# Patient Record
Sex: MALE | Race: WHITE | NOT HISPANIC OR LATINO | Employment: FULL TIME | URBAN - METROPOLITAN AREA
[De-identification: names, ages, dates, MRNs, and addresses within clinical notes are randomized per-mention and may not be internally consistent; named-entity substitution may affect disease eponyms.]

---

## 2018-03-20 ENCOUNTER — HOSPITAL ENCOUNTER (OUTPATIENT)
Dept: RADIOLOGY | Facility: HOSPITAL | Age: 43
Discharge: HOME | End: 2018-03-20
Attending: UROLOGY

## 2018-03-20 ENCOUNTER — HOSPITAL ENCOUNTER (OUTPATIENT)
Dept: CT IMAGING | Facility: HOSPITAL | Age: 43
Discharge: HOME | End: 2018-03-20
Attending: UROLOGY

## 2018-03-20 DIAGNOSIS — N20.0 KIDNEY STONE: ICD-10-CM

## 2018-03-21 ASSESSMENT — MIFFLIN-ST. JEOR: SCORE: 1896.9

## 2018-03-22 ENCOUNTER — SURGERY - HEALTHEAST (OUTPATIENT)
Dept: SURGERY | Facility: HOSPITAL | Age: 43
End: 2018-03-22

## 2018-03-22 ENCOUNTER — ANESTHESIA - HEALTHEAST (OUTPATIENT)
Dept: SURGERY | Facility: HOSPITAL | Age: 43
End: 2018-03-22

## 2019-06-27 ENCOUNTER — COMMUNICATION - HEALTHEAST (OUTPATIENT)
Dept: UROLOGY | Facility: CLINIC | Age: 44
End: 2019-06-27

## 2019-06-28 ENCOUNTER — COMMUNICATION - HEALTHEAST (OUTPATIENT)
Dept: UROLOGY | Facility: CLINIC | Age: 44
End: 2019-06-28

## 2019-07-01 ENCOUNTER — OFFICE VISIT - HEALTHEAST (OUTPATIENT)
Dept: UROLOGY | Facility: CLINIC | Age: 44
End: 2019-07-01

## 2019-07-01 DIAGNOSIS — N20.1 CALCULUS OF URETER: ICD-10-CM

## 2019-07-01 LAB
ALBUMIN UR-MCNC: NEGATIVE MG/DL
APPEARANCE UR: CLEAR
BILIRUB UR QL STRIP: NEGATIVE
COLOR UR AUTO: YELLOW
GLUCOSE UR STRIP-MCNC: NEGATIVE MG/DL
HGB UR QL STRIP: ABNORMAL
KETONES UR STRIP-MCNC: NEGATIVE MG/DL
LEUKOCYTE ESTERASE UR QL STRIP: NEGATIVE
NITRATE UR QL: NEGATIVE
PH UR STRIP: 6.5 [PH] (ref 5–8)
SP GR UR STRIP: 1.02 (ref 1–1.03)
UROBILINOGEN UR STRIP-ACNC: ABNORMAL

## 2020-07-31 ENCOUNTER — OFFICE VISIT - HEALTHEAST (OUTPATIENT)
Dept: UROLOGY | Facility: CLINIC | Age: 45
End: 2020-07-31

## 2020-07-31 DIAGNOSIS — N20.0 CALCULUS OF KIDNEY: ICD-10-CM

## 2020-08-03 ENCOUNTER — OFFICE VISIT - HEALTHEAST (OUTPATIENT)
Dept: UROLOGY | Facility: CLINIC | Age: 45
End: 2020-08-03

## 2020-08-03 DIAGNOSIS — N20.0 CALCULUS OF KIDNEY: ICD-10-CM

## 2020-08-03 DIAGNOSIS — R11.0 NAUSEA: ICD-10-CM

## 2020-08-06 ENCOUNTER — RECORDS - HEALTHEAST (OUTPATIENT)
Dept: ADMINISTRATIVE | Facility: OTHER | Age: 45
End: 2020-08-06

## 2020-08-12 ENCOUNTER — OFFICE VISIT - HEALTHEAST (OUTPATIENT)
Dept: FAMILY MEDICINE | Facility: CLINIC | Age: 45
End: 2020-08-12

## 2020-08-12 DIAGNOSIS — R10.11 RUQ ABDOMINAL PAIN: ICD-10-CM

## 2020-08-12 ASSESSMENT — MIFFLIN-ST. JEOR: SCORE: 1806.85

## 2020-08-20 ENCOUNTER — HOSPITAL ENCOUNTER (OUTPATIENT)
Dept: ULTRASOUND IMAGING | Facility: HOSPITAL | Age: 45
Discharge: HOME | End: 2020-08-20
Attending: FAMILY MEDICINE

## 2020-08-20 ENCOUNTER — COMMUNICATION - HEALTHEAST (OUTPATIENT)
Dept: FAMILY MEDICINE | Facility: CLINIC | Age: 45
End: 2020-08-20

## 2020-08-20 DIAGNOSIS — R10.11 RUQ ABDOMINAL PAIN: ICD-10-CM

## 2020-08-28 ENCOUNTER — RECORDS - HEALTHEAST (OUTPATIENT)
Dept: ADMINISTRATIVE | Facility: OTHER | Age: 45
End: 2020-08-28

## 2020-09-24 ENCOUNTER — RECORDS - HEALTHEAST (OUTPATIENT)
Dept: ADMINISTRATIVE | Facility: OTHER | Age: 45
End: 2020-09-24

## 2020-10-16 ENCOUNTER — AMBULATORY - HEALTHEAST (OUTPATIENT)
Dept: CARE COORDINATION | Facility: CLINIC | Age: 45
End: 2020-10-16

## 2020-10-16 DIAGNOSIS — R81 GLYCOSURIA: ICD-10-CM

## 2020-10-20 ENCOUNTER — COMMUNICATION - HEALTHEAST (OUTPATIENT)
Dept: NURSING | Facility: CLINIC | Age: 45
End: 2020-10-20

## 2021-03-03 ENCOUNTER — OFFICE VISIT - HEALTHEAST (OUTPATIENT)
Dept: FAMILY MEDICINE | Facility: CLINIC | Age: 46
End: 2021-03-03

## 2021-03-03 ENCOUNTER — COMMUNICATION - HEALTHEAST (OUTPATIENT)
Dept: FAMILY MEDICINE | Facility: CLINIC | Age: 46
End: 2021-03-03

## 2021-03-03 DIAGNOSIS — J45.21 MILD INTERMITTENT ASTHMA WITH ACUTE EXACERBATION: ICD-10-CM

## 2021-03-03 RX ORDER — ALBUTEROL SULFATE 90 UG/1
AEROSOL, METERED RESPIRATORY (INHALATION)
Qty: 1 INHALER | Refills: 5 | Status: SHIPPED | OUTPATIENT
Start: 2021-03-03 | End: 2022-12-13

## 2021-03-03 RX ORDER — BUDESONIDE AND FORMOTEROL FUMARATE DIHYDRATE 80; 4.5 UG/1; UG/1
2 AEROSOL RESPIRATORY (INHALATION) 2 TIMES DAILY PRN
Qty: 1 INHALER | Refills: 11 | Status: SHIPPED | OUTPATIENT
Start: 2021-03-03 | End: 2023-05-04

## 2021-03-03 ASSESSMENT — MIFFLIN-ST. JEOR: SCORE: 1868.31

## 2021-04-05 ENCOUNTER — COMMUNICATION - HEALTHEAST (OUTPATIENT)
Dept: FAMILY MEDICINE | Facility: CLINIC | Age: 46
End: 2021-04-05

## 2021-05-25 ENCOUNTER — RECORDS - HEALTHEAST (OUTPATIENT)
Dept: ADMINISTRATIVE | Facility: CLINIC | Age: 46
End: 2021-05-25

## 2021-05-28 ASSESSMENT — ASTHMA QUESTIONNAIRES: ACT_TOTALSCORE: 11

## 2021-05-29 ENCOUNTER — RECORDS - HEALTHEAST (OUTPATIENT)
Dept: ADMINISTRATIVE | Facility: CLINIC | Age: 46
End: 2021-05-29

## 2021-05-30 NOTE — TELEPHONE ENCOUNTER
Message left for patient to call clinic to set up an appointment for kidney stone follow-up for today or tomorrow.  Lorraine Durant RN

## 2021-05-30 NOTE — PROGRESS NOTES
Assessment/Plan:        Diagnoses and all orders for this visit:    Calculus of ureter  -     Symptom Control While Passing a Stone Education  -     Patient Stated Goal: Pass my stone  -     CT Abdomen Pelvis Without Oral Without IV Contrast; Future; Expected date: 07/31/2019  -     Urinalysis Macroscopic    Other orders  -     UNABLE TO FIND; Med Name:Digestive Optimizer by [employer]      Stone Management Plan  [facility] 7/1/2019   Urinary Tract Infection No suspicion of infection   Renal Colic Well controlled symptoms   Renal Failure No suspicion of renal failure   Current CT date 6/27/2019   Right sided stones? Yes   R Number of ureteral stones No ureteral stones   R Number of kidney stones  1   R GSD of kidney stones 2 - 4   R Hydronephrosis None   R Stone Event No current event   R Current Plan Observe   Observe rationale Limited stone burden with good prognosis for spontaneous passage   Left sided stones? Yes   L Number of ureteral stones 1   L GSD of ureteral stones 3   L Location of ureteral stone Proximal   L Number of kidney stones  1   L GSD of kidney stones < 2   L Hydronephrosis Mild   L Stone Event New event   Diagnosis date 6/27/2019   Initial location of primary symptomatic stone Proximal   Initial GSD of primary symptomatic stone 3   L MET Status Initiation   L Current Plan MET   MET (No Data)             Subjective:      HPI  Mr. Daniel Malhotra is a 43 y.o.  male presenting to the [facility] [place] for a new problem.    He is a recurrent calcium oxalate and calcium phosphate (apatite) stone former who has required stone clearance procedures. He has participated in stone risk evaluation in the remote past with uncertain findings. He has no identified modifiable stone risk factors. He has identified non-modifiable stone risks including:  early age at first stone.     He presented to ED with sudden onset, severe, left flank pain. He has had recurrent stones from age  22. Stone episodes are accompanied by significant GI upset. He has had a remote risk evaluation without clear results. 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 He did have a right ureteroscopy about 1 year ago.    CT scan is personally reviewed and demonstrates 3 mm left proximal stone.    Significant labs from presentation are detailed below. It is noted that his serum calcium is high.    PLAN  Will proceed with medical expulsive therapy. Risks and benefits were detailed of medical expulsive therapy including probability of stone passage, recurrent renal colic, and requirement of emergency medical and/or surgical care and further imaging. Patient verbalized understanding. Patient agrees with plan as discussed. He will return in 2 weeks with low dose CT scan.    Over the counter symptom control medications of ibuprofen, Dramamine and Tylenol were recommended.     He is very interested in exploring stone risk reduction at the conclusion of this episode.     ROS   Review of Systems  A 12 point comprehensive review of systems is negative except for HPI    Past Medical History:   Diagnosis Date     Gout      Kidney stone        Past Surgical History:   Procedure Laterality Date     [place] CYSTO/URETERO W/LITHOTRIPSY &INDWELL STENT INSRT Right 3/22/2018    Procedure: CYSTOSCOPY, RIGHT URETEROSCOPY WITH LASER LITHOTRIPSY AND STENT;  Surgeon: Antonio Blake MD;  Location: [facility] [place];  Service: Urology     MO CYSTOURETHROSCOPY,URETER CATHETER Right 3/22/2018    Procedure: CYSTOSCOPY, WITH RETROGRADE PYELOGRAM;  Surgeon: Antonio Blake MD;  Location: [facility] [place];  Service: Urology       Current Outpatient Medications   Medication Sig Dispense Refill     ondansetron (ZOFRAN) 4 MG tablet Take 1 tablet (4 mg total) by mouth every 8 (eight) hours as needed for nausea. 12 tablet 0     oxyCODONE-acetaminophen (PERCOCET/ENDOCET) 5-325 mg per tablet Take 1 tablet by mouth every 6 (six) hours as  needed for pain. 10 tablet 0     tamsulosin (FLOMAX) 0.4 mg cap Take 1 capsule (0.4 mg total) by mouth daily. 30 capsule 0     UNABLE TO FIND Med Name:Digestive Optimizer by [employer]       No current facility-administered medications for this visit.        No Known Allergies    Social History     Socioeconomic History     Marital status: Single     Spouse name: Not on file     Number of children: Not on file     Years of education: Not on file     Highest education level: Not on file   Occupational History     Occupation: Pest Control   Social Needs     Financial resource strain: Not on file     Food insecurity:     Worry: Not on file     Inability: Not on file     Transportation needs:     Medical: Not on file     Non-medical: Not on file   Tobacco Use     Smoking status: Current Some Day Smoker     Smokeless tobacco: Never Used   Substance and Sexual Activity     Alcohol use: Yes     Comment: occas     Drug use: Not on file     Sexual activity: Not on file   Lifestyle     Physical activity:     Days per week: Not on file     Minutes per session: Not on file     Stress: Not on file   Relationships     Social connections:     Talks on phone: Not on file     Gets together: Not on file     Attends Latter day service: Not on file     Active member of club or organization: Not on file     Attends meetings of clubs or organizations: Not on file     Relationship status: Not on file     Intimate partner violence:     Fear of current or ex partner: Not on file     Emotionally abused: Not on file     Physically abused: Not on file     Forced sexual activity: Not on file   Other Topics Concern     Not on file   Social History Narrative     Not on file       Family History   Problem Relation Age of Onset     Urolithiasis Neg Hx      Clotting disorder Neg Hx      Gout Neg Hx        Objective:      Physical Exam  Vitals:    07/01/19 1505   BP: (!) 140/95   Pulse: 84   Temp: 97.8  F (36.6  C)     General - well developed, well  nourished, appropriate for age. Appears no distress at this time   Heart - regular rate and rhythm, no murmur  Respiratory - normal effort, clear to auscultation, good air entry without adventitious noises  Abdomen - slender soft, non-tender, no hepatosplenomegaly, no masses.   - no flank tenderness, no suprapubic tenderness, kidney and bladder non-palpable  MSK - normal spinal curvature. no spinal tenderness. normal gait. muscular strength intact.  Neurology - cranial nerves II-XII grossly intact, normal sensation, no unsteadiness  Skin - intact, no bruising, no gouty tophi  Psych - oriented to time, place, and person, normal mood and affect.      Labs  Urinalysis POC (Office):  Nitrite, UA   Date Value Ref Range Status   07/01/2019 Negative Negative Final   06/27/2019 Negative Negative Final   04/04/2018 Negative Negative Final       Lab Urinalysis:  Blood, UA   Date Value Ref Range Status   07/01/2019 Trace (!) Negative Final   06/27/2019 Negative Negative Final   04/04/2018 Moderate (!) Negative Final     Nitrite, UA   Date Value Ref Range Status   07/01/2019 Negative Negative Final   06/27/2019 Negative Negative Final   04/04/2018 Negative Negative Final     Leukocytes, UA   Date Value Ref Range Status   07/01/2019 Negative Negative Final   06/27/2019 Negative Negative Final   04/04/2018 Small (!) Negative Final     pH, UA   Date Value Ref Range Status   07/01/2019 6.5 5.0 - 8.0 Final   06/27/2019 8.5 (H) 4.5 - 8.0 Final   04/04/2018 7.5 4.5 - 8.0 Final    and Acute Labs   CBC   WBC   Date Value Ref Range Status   06/27/2019 18.1 (H) 4.0 - 11.0 thou/uL Final   04/04/2018 21.0 (H) 4.0 - 11.0 thou/uL Final   03/23/2018 12.6 (H) 4.0 - 11.0 thou/uL Final     Hemoglobin   Date Value Ref Range Status   06/27/2019 17.5 14.0 - 18.0 g/dL Final   04/04/2018 16.9 14.0 - 18.0 g/dL Final   03/23/2018 16.9 14.0 - 18.0 g/dL Final     Platelets   Date Value Ref Range Status   06/27/2019 175 140 - 440 thou/uL Final    04/04/2018 204 140 - 440 thou/uL Final   03/23/2018 144 140 - 440 thou/uL Final   , C Reactive Protein    CRP   Date Value Ref Range Status   06/27/2019 0.4 0.0 - 0.8 mg/dL Final   , Renal Panel  [facility]  Creatinine   Date Value Ref Range Status   06/27/2019 1.10 0.70 - 1.30 mg/dL Final   04/04/2018 1.03 0.70 - 1.30 mg/dL Final   03/23/2018 0.86 0.70 - 1.30 mg/dL Final     Potassium   Date Value Ref Range Status   06/27/2019 4.3 3.5 - 5.0 mmol/L Final   04/04/2018 4.7 3.5 - 5.0 mmol/L Final   03/23/2018 4.5 3.5 - 5.0 mmol/L Final     Calcium   Date Value Ref Range Status   06/27/2019 10.9 (H) 8.5 - 10.5 mg/dL Final   04/04/2018 10.1 8.5 - 10.5 mg/dL Final   03/23/2018 9.8 8.5 - 10.5 mg/dL Final    and Urine Culture    Culture   Date Value Ref Range Status   04/04/2018 No Growth  Final   03/23/2018 No Growth  Final

## 2021-05-30 NOTE — PATIENT INSTRUCTIONS - HE
Patient Stated Goal: Pass my stone  Symptom Control While Passing A Stone    The goal of [facility] [place] is to let a smaller kidney stone (less than 4 to 5 mm) pass without intervention if possible. Giving your body a chance to clear the stone may take a few hours up to a few weeks.  Keeping you well-informed, safe and fairly comfortable is important.    Drink to thirst  Do not attempt to  flush out  your stone by drinking too much fluid. This does not work and may increase nausea. Drink enough to satisfy your body s thirst. Eating your normal diet is fine.   Medications (that may be suggested or prescribed)    Ibuprofen (Advil or Motrin) Available over the counter  o Take two (200mg) tablets every six hours until the stone passes.  o Prevents spasm of the ureter.    o Decreases pain.      Dramamine* (drowsy version, non-generic formulation) Available over the counter  o Take 50mg at bedtime  o Decreases spasms of the ureter  o Decreases nausea  o Decreases acute pain  o Decreases recurrence of pain for 24 hours  o Will help you sleep  *This medication will cause increase drowsiness, do not drive or operate machinery for 6 hours.      Narcotics (Percocet, Vicodin, Dilaudid) Take as prescribed for severe pain unrelieved by ibuprofen and Dramamine  o Narcotics have significant side effects and only  cover-up  pain. They have no effect on the cause of pain.  o Common side effects  - Confusion, disorientation and sedation - DO NOT DRIVE OR OPERATE MACHINERY WITHIN 24 HOURS  - Nausea - take Dramamine or Zofran or Haldol to help control  - Constipation  - Sleep disturbances      Ondansetron (Zofran) Take as prescribed  o Reserve for severe nausea  o May cause constipation, start over the counter Miralax if needed      Second Line Anti-Nausea Medication: Adding a different anti-nausea medication maybe helpful for persistent nausea.  The combined effect of different types of anti-nausea medications maybe more  effective than either medication by itself, even in higher doses.  o Compazine: Take as prescribed      Information about kidney stones    Crystals can form if chemicals are too concentrated in your urine. If the crystal grows over time, a stone may form. A stone usually isn t painful while it is still in the kidney.    As the stone begins to leave the kidney, you may experience episodes of flank pain as the kidney stone approaches the entrance to the ureter. Some people feel a vague ache in the side.    Kidney stones may fall into the ureter. Some stones are tiny and pass through without causing symptoms. The ureter is a small tube (approximately 1/8 of an inch wide). A kidney stone can get stuck and block the ureter. If this happens, urine backs up and flows back to the kidney. Back pressure on the kidney can cause:  o Severe flank pain radiating to the groin.  o Severe nausea and vomiting.  o The pain can occur in the lower back, side, groin or all three.      When the stone reaches the lower ureter, this can irritate the bladder and sensations of feeling the urge to urinate frequently and urgently may occur.      Once the stone passes out of your ureter and into your bladder, the symptoms of urgency and frequency will often disappear. Sometimes pain will come back for a short period and will not be as severe as before. The passage of the stone from your bladder and out of your body is usually not a problem. The urethra is at least twice as wide as the normal ureter, so the stone doesn t usually block it.    Strain all urine  If you pass the stone, save it. Place it in the container we have provided and bring it to the [facility] [place] within a week of passing it. Your stone will then be sent for analysis which takes about a month.     Signs and symptoms you might experience    Nausea    Decreased appetite    Urinary frequency    Bloody urine     Chills    Fatigue    When to call [facility] [place] or  go to the Emergency Room    Fever with a temperature greater than 100.1    Severe pain    Persistent nausea/vomiting    If the pain worsens or nausea/vomiting is uncontrolled with medications, STOP eating & drinking. You need to have an empty stomach for 8 hours prior to surgery. Call the [facility] [place] immediately at [phone].           Follow-up    Low dose CT scan with doctor visit 1-2 weeks after initial clinic visit per doctor s instructions    Please cancel the CT scan visit if you pass a stone. Reschedule for a one month follow-up with doctor to discuss stone composition and future prevention.    Preventing future stones    Approximately a month after your stone is sent out for analysis, a prevention visit will occur with your provider, to discuss an individualized plan for prevention of new stones and to discuss managing stones that you may still have. Along with the analysis of the kidney stone, blood and urine tests may be indicated to develop this plan. Knowing the type of kidney stones you make, and why, allows the providers at the [facility] to recommend specific ways to prevent them.    Follow-up visits are an important part of monitoring and preventing future re-occurrences.    The [facility] is available for questions or concerns 24 hours a day at [phone]

## 2021-06-01 VITALS — HEIGHT: 73 IN | WEIGHT: 209 LBS | BODY MASS INDEX: 27.7 KG/M2

## 2021-06-04 VITALS — BODY MASS INDEX: 25.33 KG/M2 | WEIGHT: 192 LBS

## 2021-06-10 NOTE — PROGRESS NOTES
Please call Daniel with ultrasound results. Attempted to relay results myself but no answer. Let him know it was normal. Is he still having pain or are symptoms resolved?  Laina Banda,  08/20/20 12:17 PM

## 2021-06-10 NOTE — PROGRESS NOTES
[facility] Note    Name: Daniel Malhotra  : 1975   MRN: [id number]    Daniel Malhotra is a 44 y.o. male presenting to discuss the following:     CC:   Chief Complaint   Patient presents with     Establish Care     Follow-up       HPI:  Notes correlation of symptoms with kidney stones, usually symptoms improve after he passes as stone.     Notes when he eats greasy or bad foods, feels sick. If he drinks beer or eats coffee, also feels sick. Gets a really sensitive stomach all the sudden. Also notes a correlation with sea food or red meat. Also correlates with eating too much bread. Doesn't know what to eat when symptoms flare, loses weight.     At times, feels fine, then feels onset of diaphoresis and looks pale to others. After about 8 hours of vomiting, laying around, cold sweats, feels better. Hot baths and showers also are helpful. Can't get symptoms to go away very easily. Tries ibuprofen and tylenol as well as a gas pill.    Stomach feels crampy and tripp in a rhythmic pattern. Sometimes goes to the ED, meds help.    Had taken Prilosec in the past and hasn't been helpful. Has not had an EGD in the past.     ROS:   CONSTITUTIONAL: No more fevers or chills. Appetite is still poor.   HEART: No chest pain or palpitations.   ABDOMEN: See above.     PMH:   Patient Active Problem List   Diagnosis     Elevated liver enzymes     Calculus of kidney       Past Medical History:   Diagnosis Date     Gout      Kidney stone        PSH:   Past Surgical History:   Procedure Laterality Date     [place] CYSTO/URETERO W/LITHOTRIPSY &INDWELL STENT INSRT Right 3/22/2018    Procedure: CYSTOSCOPY, RIGHT URETEROSCOPY WITH LASER LITHOTRIPSY AND STENT;  Surgeon: Antonio Blake MD;  Location: [facility];  Service: Urology     AK CYSTOURETHROSCOPY,URETER CATHETER Right 3/22/2018    Procedure: CYSTOSCOPY, WITH RETROGRADE PYELOGRAM;  Surgeon: Antonio Blake MD;  Location: [facility];  Service:  "Urology         MEDICATIONS:   Current Outpatient Medications on File Prior to Visit   Medication Sig Dispense Refill     dimenhyDRINATE (DRAMAMINE) 50 MG tablet Take 50 mg by mouth at bedtime as needed.       ibuprofen (IBUPROFEN IB) 200 MG tablet Take 200 mg by mouth every 6 (six) hours as needed for pain.       UNABLE TO FIND Med Name:Digestive Optimizer by [employer]       No current facility-administered medications on file prior to visit.        ALLERGIES:  No Known Allergies    FAMHx:  Family History   Problem Relation Age of Onset     Urolithiasis Neg Hx      Clotting disorder Neg Hx      Gout Neg Hx        SOCIAL HISTORY:   Social History     Tobacco Use     Smoking status: Current Some Day Smoker     Smokeless tobacco: Never Used   Substance Use Topics     Alcohol use: Yes     Comment: occas     Drug use: Not on file       PHYSICAL EXAM:   /88   Pulse 80   Temp 99.1  F (37.3  C) (Oral)   Ht 6' 0.5\" (1.842 m)   Wt 192 lb (87.1 kg)   BMI 25.68 kg/m     GENERAL: Daniel is a pleasant, well appearing male, in no acute distress.   HEENT: Sclera white, no cervical lymphadenopathy, no thyromegaly.   HEART: Regular rate and rhythm, no murmurs.   LUNGS: Clear to auscultation bilaterally, unlabored.   ABDOMEN: Soft, RUQ mildly tender to palpation, no guarding, no rigidity, no rebound tenderness, no palpable masses. No costovertebral angle tenderness.   EXTREMITIES: No lower extremity edema.     ASSESSMENT & PLAN:   Daniel Malhotra is a 44 y.o. male presenting today for ED follow up.     1. RUQ abdominal pain  - US Abdomen Limited; Future     Reviewed ED note from [facility] dated July 28.  He was diagnosed with acute right flank pain and elevated blood pressures without hypertension diagnosis.  Abdominal CT revealed multiple small bilateral nonobstructing kidney stones without hydronephrosis or obstructing ureteral stone.  Urine negative for infection or red blood cells.  White blood cells mildly elevated at " 12.2.  CMP normal.  Lipase normal.  Pain had improved and he was discharged for outpatient follow-up.    Discussed with patient that it is unlikely to be kidney stone mediated if he is not acutely having any obstruction or blockage.  Given location of pain and worsening with greasy foods, I am suspicious for possible underlying biliary colic.  Recommend we proceed with right upper quadrant ultrasound for further evaluation.  Consider evaluation with general surgery.  Also discussed differential diagnosis of pancreatitis which is unlikely because he had a normal lipase.  Differential diagnosis also includes acute gastritis, but he states that PPIs have not been helpful for this pain in the past.    2. Elevated blood pressure  Blood pressure is in the prehypertensive range today.  We will follow-up at next visit.    RTC: 1 month - follow up blood pressure / sooner as needed  Will follow up with RUQ US results.     Laina Banda, DO

## 2021-06-10 NOTE — PROGRESS NOTES
"Assessment/Plan:        1) kidney stones - complete risk evaluation  2) abdominal pain - sounds like GI issue. Start with PCP and hopefully avoid further ED encounters        Phone call duration: 24 minutes    Simone Traore MD     Subjective:      The patient has been notified of following:     \"This telephone visit will be conducted via a call between you and your physician/provider. We have found that certain health care needs can be provided without the need for a physical exam.  This service lets us provide the care you need with a short phone conversation.  If a prescription is necessary we can send it directly to your pharmacy.  If labs and/or imaging are needed, we can place orders so you can have the test (s) done at a later time.    If during the course of the call the physician/provider feels a telephone visit is not appropriate, you will not be charged for this service.\"     HPI  Mr. Daniel Malhotra is a 44 y.o.  male who is being evaluated via a billable telephone visit by [facility] [place] for follow up of his stone disease.     He has had days to weeks of nausea, vomiting, anterior abdominal pain, abdominal bloating, and belching. Says he is unable to sleep. Getting fluids in but not much else. Very frustrated with persistence. Similar to symptoms with prior obstructing renal calculi but in this instance, no flank pain. Several ED visits. No relief with anti-emetics and antacids.    CT is personally reviewed and demonstrated several peripheral right renal calculi which are not consistent with current symptoms. To my review stomach, small bowel, large bowel, and gall bladder all look unremarkable.    We had a detailed discussion regarding how the nausea from obstructing urteeral stones is mediated through the splanchnic plexus and can be indistinguishable from GI causes of nausea - typically gastritis.    I suggested that he make contact with a PCP to explore options " "for cooling off his stomach and further evaluation as indicated. I also clearly communicated that if he arrives at an ED and announces that he is having a \"stone attack\" and there are no obstructing stones, that he may be setting the stage for frustration. I suggested that this issue be approached as nausea that d=feels just like my stones but I currently have no stones which would be causing this issues.    Down the road, he should definitely have risk factors for his rapid stone recurrence evaluated. We have future orders in place to attend to this ssue once the current poblem resolves.       ROS   Review of systems is negative except for HPI.    Past Medical History:   Diagnosis Date     Gout      Kidney stone        Past Surgical History:   Procedure Laterality Date     OR CYSTO/URETERO W/LITHOTRIPSY &INDWELL STENT INSRT Right 3/22/2018    Procedure: CYSTOSCOPY, RIGHT URETEROSCOPY WITH LASER LITHOTRIPSY AND STENT;  Surgeon: Antonio Blake MD;  Location: [facility];  Service: Urology     OR CYSTOURETHROSCOPY,URETER CATHETER Right 3/22/2018    Procedure: CYSTOSCOPY, WITH RETROGRADE PYELOGRAM;  Surgeon: Antonio Blake MD;  Location: [facility];  Service: Urology       Current Outpatient Medications   Medication Sig Dispense Refill     dimenhyDRINATE (DRAMAMINE) 50 MG tablet Take 50 mg by mouth at bedtime as needed.       ibuprofen (IBUPROFEN IB) 200 MG tablet Take 200 mg by mouth every 6 (six) hours as needed for pain.       UNABLE TO FIND Med Name:Digestive Optimizer by [employer]       No current facility-administered medications for this visit.        No Known Allergies    Social History     Socioeconomic History     Marital status: Single     Spouse name: Not on file     Number of children: Not on file     Years of education: Not on file     Highest education level: Not on file   Occupational History     Occupation: Pest Control   Social Needs     Financial resource strain: Not on file     Food " insecurity     Worry: Not on file     Inability: Not on file     Transportation needs     Medical: Not on file     Non-medical: Not on file   Tobacco Use     Smoking status: Current Some Day Smoker     Smokeless tobacco: Never Used   Substance and Sexual Activity     Alcohol use: Yes     Comment: occas     Drug use: Not on file     Sexual activity: Not on file   Lifestyle     Physical activity     Days per week: Not on file     Minutes per session: Not on file     Stress: Not on file   Relationships     Social connections     Talks on phone: Not on file     Gets together: Not on file     Attends Mandaeism service: Not on file     Active member of club or organization: Not on file     Attends meetings of clubs or organizations: Not on file     Relationship status: Not on file     Intimate partner violence     Fear of current or ex partner: Not on file     Emotionally abused: Not on file     Physically abused: Not on file     Forced sexual activity: Not on file   Other Topics Concern     Not on file   Social History Narrative     Not on file       Family History   Problem Relation Age of Onset     Urolithiasis Neg Hx      Clotting disorder Neg Hx      Gout Neg Hx        Objective:      Labs   Urinalysis POC (Office):  Nitrite, UA   Date Value Ref Range Status   07/28/2020 Negative Negative Final   07/01/2019 Negative Negative Final   06/27/2019 Negative Negative Final       Lab Urinalysis:  Blood, UA   Date Value Ref Range Status   07/28/2020 Negative Negative Final   07/01/2019 Trace (!) Negative Final   06/27/2019 Negative Negative Final     Nitrite, UA   Date Value Ref Range Status   07/28/2020 Negative Negative Final   07/01/2019 Negative Negative Final   06/27/2019 Negative Negative Final     Leukocytes, UA   Date Value Ref Range Status   07/28/2020 Negative Negative Final   07/01/2019 Negative Negative Final   06/27/2019 Negative Negative Final     pH, UA   Date Value Ref Range Status   07/28/2020 8.0 4.5 - 8.0  Final   07/01/2019 6.5 5.0 - 8.0 Final   06/27/2019 8.5 (H) 4.5 - 8.0 Final    and Acute Labs   CBC   WBC   Date Value Ref Range Status   07/28/2020 12.2 (H) 4.0 - 11.0 thou/uL Final   06/27/2019 18.1 (H) 4.0 - 11.0 thou/uL Final   04/04/2018 21.0 (H) 4.0 - 11.0 thou/uL Final     Hemoglobin   Date Value Ref Range Status   07/28/2020 16.8 14.0 - 18.0 g/dL Final   06/27/2019 17.5 14.0 - 18.0 g/dL Final   04/04/2018 16.9 14.0 - 18.0 g/dL Final     Platelets   Date Value Ref Range Status   07/28/2020 163 140 - 440 thou/uL Final   06/27/2019 175 140 - 440 thou/uL Final   04/04/2018 204 140 - 440 thou/uL Final   , C Reactive Protein    CRP   Date Value Ref Range Status   06/27/2019 0.4 0.0 - 0.8 mg/dL Final   , Renal Panel  [facility]  Creatinine   Date Value Ref Range Status   07/28/2020 1.10 0.70 - 1.30 mg/dL Final   06/27/2019 1.10 0.70 - 1.30 mg/dL Final   04/04/2018 1.03 0.70 - 1.30 mg/dL Final     Potassium   Date Value Ref Range Status   07/28/2020 3.9 3.5 - 5.0 mmol/L Final   06/27/2019 4.3 3.5 - 5.0 mmol/L Final   04/04/2018 4.7 3.5 - 5.0 mmol/L Final     Calcium   Date Value Ref Range Status   07/28/2020 9.7 8.5 - 10.5 mg/dL Final   06/27/2019 10.9 (H) 8.5 - 10.5 mg/dL Final   04/04/2018 10.1 8.5 - 10.5 mg/dL Final    and Urine Culture    Culture   Date Value Ref Range Status   04/04/2018 No Growth  Final   03/23/2018 No Growth  Final

## 2021-06-10 NOTE — PROGRESS NOTES
Assessment/Plan:        Diagnoses and all orders for this visit:    Calculus of kidney  -     Calcium, Ionized, Measured; Future; Expected date: 08/14/2020  -     Parathyroid Hormone Intact with Minerals; Future; Expected date: 08/14/2020  -     Uric Acid; Future; Expected date: 08/14/2020  -     Patient Stated Goal: Prevent further stones  -     24 Hour Urine Collection Steps Education  -     Vitamin D, Total (25-Hydroxy)- Future; Future; Expected date: 08/01/2020    Other orders  -     dimenhyDRINATE (DRAMAMINE) 50 MG tablet; Take 50 mg by mouth at bedtime as needed.  -     ibuprofen (IBUPROFEN IB) 200 MG tablet; Take 200 mg by mouth every 6 (six) hours as needed for pain.    Stone Management Plan  [facility] Stone Management 7/1/2019 7/31/2020   Urinary Tract Infection No suspicion of infection No suspicion of infection   Renal Colic Well controlled symptoms Asymptomatic at this time   Renal Failure No suspicion of renal failure No suspicion of renal failure   Current CT date 6/27/2019 7/28/2020   Right sided stones? Yes Yes   R Number of ureteral stones No ureteral stones No ureteral stones   R Number of kidney stones  1 2   R GSD of kidney stones 2 - 4 2 - 4   R Hydronephrosis None None   R Stone Event No current event No current event   R Current Plan Observe Observe   Observe rationale Limited stone burden with good prognosis for spontaneous passage Limited stone burden with good prognosis for spontaneous passage   Left sided stones? Yes Yes   L Number of ureteral stones 1 No ureteral stones   L GSD of ureteral stones 3 -   L Location of ureteral stone Proximal -   L Number of kidney stones  1 1   L GSD of kidney stones < 2 2 - 4   L Hydronephrosis Mild None   L Stone Event New event No current event   Diagnosis date 6/27/2019 -   Initial location of primary symptomatic stone Proximal -   Initial GSD of primary symptomatic stone 3 -   L MET Status Initiation -   L Current Plan MET Observe   MET (No Data) -   Observe  "rationale - Limited stone burden with good prognosis for spontaneous passage         Video-Visit Details  Patient has given verbal consent to a Video visit? Yes  Patient would like the video invitation sent by: Text to cell phone: [phone]  Video Start Time: 10:05 AM  Type of service:  Video Visit  Video End Time (time video stopped): 10:28 AM  Originating Location (pt. Location): Home  Distant Location (provider location):  [facility]   Mode of Communication:  Video Conference via [employer]  Ofelia Martinez PA-C    Subjective:      The patient has been notified of following:     \"This video visit will be conducted via a call between you and your physician/provider. We have found that certain health care needs can be provided without the need for an in-person physical exam.  This service lets us provide the care you need with a video conversation.  If a prescription is necessary we can send it directly to your pharmacy.  If lab work is needed we can place an order for that and you can then stop by our lab to have the test done at a later time.    Video visits are billed at different rates depending on your insurance coverage. Please reach out to your insurance provider with any questions.    If during the course of the call the physician/provider feels a video visit is not appropriate, you will not be charged for this service.\"    HPI  Mr. Daniel Malhotra is a 44 y.o.  male who is being evaluated via a billable video visit by [facility] following [facility] visit for flank pain with nephrolithiasis.    He is a rapidly recurrent calcium oxalate and calcium phosphate (apatite) stone former who has required stone clearance procedures. He has participated in stone risk evaluation in the remote past with uncertain findings. He has no identified modifiable stone risk factors. He has identified non-modifiable stone risks including:  early age at first stone " and multiple stones at presentation.    He was last seen in our office ~ 1 year ago with imaging notable for a 3 mm left proximal ureteral stone and tiny right renal stone. He failed to return for trial of passage followup. He states he has had similar episodes and has previously been evaluated by various providers, being told on several occasions that no discrete etiology found for his symptoms secondary to kidney stones.     He was evaluated in ER 7/28/20 for recurrent right flank pain. He reported similar pain 2 months prior that eventually resolved. The pain returned 2 days prior to ER with severe, cramping pain in right flank and abdomen. No associated alleviating or aggravating factors. He recalled similar symptoms with history of kidney stones over past 20 years.  Significant associated symptoms at presentation included:  nausea, vomiting, dysuria, sweats and diarrhea. He was sent home without medications.    He had another bout of pain with diaphoresis, nausea and vomiting 2 days ago , but has been asymptomatic since. He managed symptoms with OTC medications and left over percocet. He has not seen a stone pass. He denies symptoms of current fever, chills, flank pain, nausea, vomiting, urinary frequency and dysuria. When asked, he states he has previously undergone GI work up including colonoscopy with no discrete diagnoses.    CT scan from 7/28/20 is personally reviewed and demonstrates small, nonobstructing, bilateral renal stones. There is a 3 mm left upper pole renal stone and 2 right upper pole renal stones, both 2 mm. No ureteral stones. No hydronephrosis.    Significant labs from presentation include no hematuria, no pyuria, negative nitrite, no bacteria, slightly elevated WBC, normal creatinine and normal potassium.    PLAN    45 yo M with hx of early onset, recurrent calcium based stone disease. Bilateral, nonobstructing renal stones with active growth over past year. Right abdominal pain with  "significant GI symptoms, etiology unclear.    Will initiate comprehensive stone risk evaluation. Serum chemistries including PTH, ionized calcium and vitamin D to be obtained. Two 24 hour urine collections and dietary journal will be obtained at earliest covenience. Patient verbalized understanding. Patient agrees with plan as discussed. He will return to clinic when labs are available.      Towards closing of encounter, patient seemed dissatisfied with conclusion and states \"that's it\". Attempted to express to patient that based on recent labs/imaging, no malicious event noted from a kidney stone standpoint. Could not correlate recent symptoms were secondary to stone (s) but may have had a stone pass or could have mobile stone that caused intermittent obstruction. Patient was informed to call our office if symptoms re-emerged and that follow up encounter will be set up with another provider, if patient desires.       ROS   Review of systems is negative except for HPI    Past Medical History:   Diagnosis Date     Gout      Kidney stone        Past Surgical History:   Procedure Laterality Date     MT CYSTO/URETERO W/LITHOTRIPSY &INDWELL STENT INSRT Right 3/22/2018    Procedure: CYSTOSCOPY, RIGHT URETEROSCOPY WITH LASER LITHOTRIPSY AND STENT;  Surgeon: Antonio Blake MD;  Location: [facility];  Service: Urology     MT CYSTOURETHROSCOPY,URETER CATHETER Right 3/22/2018    Procedure: CYSTOSCOPY, WITH RETROGRADE PYELOGRAM;  Surgeon: Antonio Blake MD;  Location: [facility];  Service: Urology       Current Outpatient Medications   Medication Sig Dispense Refill     dimenhyDRINATE (DRAMAMINE) 50 MG tablet Take 50 mg by mouth at bedtime as needed.       ibuprofen (IBUPROFEN IB) 200 MG tablet Take 200 mg by mouth every 6 (six) hours as needed for pain.       UNABLE TO FIND Med Name:Digestive Optimizer by [employer]       No current facility-administered medications for this visit.        No Known " Allergies    Social History     Socioeconomic History     Marital status: Single     Spouse name: Not on file     Number of children: Not on file     Years of education: Not on file     Highest education level: Not on file   Occupational History     Occupation: Pest Control   Social Needs     Financial resource strain: Not on file     Food insecurity     Worry: Not on file     Inability: Not on file     Transportation needs     Medical: Not on file     Non-medical: Not on file   Tobacco Use     Smoking status: Current Some Day Smoker     Smokeless tobacco: Never Used   Substance and Sexual Activity     Alcohol use: Yes     Comment: occas     Drug use: Not on file     Sexual activity: Not on file   Lifestyle     Physical activity     Days per week: Not on file     Minutes per session: Not on file     Stress: Not on file   Relationships     Social connections     Talks on phone: Not on file     Gets together: Not on file     Attends Religion service: Not on file     Active member of club or organization: Not on file     Attends meetings of clubs or organizations: Not on file     Relationship status: Not on file     Intimate partner violence     Fear of current or ex partner: Not on file     Emotionally abused: Not on file     Physically abused: Not on file     Forced sexual activity: Not on file   Other Topics Concern     Not on file   Social History Narrative     Not on file       Family History   Problem Relation Age of Onset     Urolithiasis Neg Hx      Clotting disorder Neg Hx      Gout Neg Hx        Objective:      Physical Exam  There were no vitals filed for this visit.  General - well developed, well nourished, appropriate for age. Appears no distress at this time  MSK - normal spinal curvature. no spinal tenderness. normal gait. muscular strength intact.  Psych - oriented to time, place, and person, normal mood and affect.      Labs    Urinalysis POC (Office):  Nitrite, UA   Date Value Ref Range Status    07/28/2020 Negative Negative Final   07/01/2019 Negative Negative Final   06/27/2019 Negative Negative Final       Lab Urinalysis:  Blood, UA   Date Value Ref Range Status   07/28/2020 Negative Negative Final   07/01/2019 Trace (!) Negative Final   06/27/2019 Negative Negative Final     Nitrite, UA   Date Value Ref Range Status   07/28/2020 Negative Negative Final   07/01/2019 Negative Negative Final   06/27/2019 Negative Negative Final     Leukocytes, UA   Date Value Ref Range Status   07/28/2020 Negative Negative Final   07/01/2019 Negative Negative Final   06/27/2019 Negative Negative Final     pH, UA   Date Value Ref Range Status   07/28/2020 8.0 4.5 - 8.0 Final   07/01/2019 6.5 5.0 - 8.0 Final   06/27/2019 8.5 (H) 4.5 - 8.0 Final    and Acute Labs   CBC   WBC   Date Value Ref Range Status   07/28/2020 12.2 (H) 4.0 - 11.0 thou/uL Final   06/27/2019 18.1 (H) 4.0 - 11.0 thou/uL Final   04/04/2018 21.0 (H) 4.0 - 11.0 thou/uL Final     Hemoglobin   Date Value Ref Range Status   07/28/2020 16.8 14.0 - 18.0 g/dL Final   06/27/2019 17.5 14.0 - 18.0 g/dL Final   04/04/2018 16.9 14.0 - 18.0 g/dL Final     Platelets   Date Value Ref Range Status   07/28/2020 163 140 - 440 thou/uL Final   06/27/2019 175 140 - 440 thou/uL Final   04/04/2018 204 140 - 440 thou/uL Final   , C Reactive Protein    CRP   Date Value Ref Range Status   06/27/2019 0.4 0.0 - 0.8 mg/dL Final    and Renal Panel  [facility]  Creatinine   Date Value Ref Range Status   07/28/2020 1.10 0.70 - 1.30 mg/dL Final   06/27/2019 1.10 0.70 - 1.30 mg/dL Final   04/04/2018 1.03 0.70 - 1.30 mg/dL Final     Potassium   Date Value Ref Range Status   07/28/2020 3.9 3.5 - 5.0 mmol/L Final   06/27/2019 4.3 3.5 - 5.0 mmol/L Final   04/04/2018 4.7 3.5 - 5.0 mmol/L Final     Calcium   Date Value Ref Range Status   07/28/2020 9.7 8.5 - 10.5 mg/dL Final   06/27/2019 10.9 (H) 8.5 - 10.5 mg/dL Final   04/04/2018 10.1 8.5 - 10.5 mg/dL Final

## 2021-06-10 NOTE — TELEPHONE ENCOUNTER
----- Message from Laina Banda DO sent at 8/20/2020 12:17 PM CDT -----  Please call Daniel with ultrasound results. Attempted to relay results myself but no answer. Let him know it was normal. Is he still having pain or are symptoms resolved?  Laina Banda DO 08/20/20 12:17 PM

## 2021-06-10 NOTE — TELEPHONE ENCOUNTER
"Spoke to pt, relayed your message.  Pt states the symptoms are the same, maybe a little better.  Pt is very upset and states it is the same pain he always has when he has kidney stones, but the kidney doctor says it's not kidney stones. Now this test is normal. Pt states he is extremely frustrated.  Pt was told writer would update Dr. Banda and call back.  Pt states, \"Oh goody!\"    "

## 2021-06-10 NOTE — PATIENT INSTRUCTIONS - HE
Patient Stated Goal: Prevent further stones  Steps for collecting a 24 hour urine specimen    Please follow the directions carefully. All urine voided for a 24-hour period needs to be collected into the jug.  DO NOT change any of your  normal  daily habits when doing this test. Continue to follow your regular diet, intake of fluids, and usual activity level. Pick the most convenient day with your schedule, perhaps on a weekend or a day off.    Start your Diet Log the day before collection and continue on the day of urine collection.  You MUST bring Diet Log with you on follow up visit to discuss results.    One 24hr Urine Collection     Two 24hr Urine Collections  (do not collect on consecutive days)    PLEASE COMPLETE THE 2nd JUG WITHIN 1-2 WEEKS FROM THE 1st JUG    STEP 1  Empty your bladder completely into the toilet. This will be your start time. Write your full legal name, start date and time on the jug label.  Collection start and stop times need to match exactly!  For example:  6 am to 6 am.    STEP 2  The next time you urinate, empty your bladder directly into the jug or collection hat and pour urine into the jug.  Screw the lid back onto the jug.  Do not spill!    STEP 3  Place the jug in the refrigerator or a cooler with ice during the collection period.  Failure to keep it cool could cause inaccurate test results. DO NOT Freeze.    STEP 4  Continue collecting all urine into the jug for the rest of the day, for the full 24 hours.  DO NOT stop early or go over 24 hours!    STEP 5  Exactly 24 hours from start of collection, write your full legal name, stop date and time on the jug label.   Collection start and stop times need to match exactly!  For example:  6 am to 6 am.  Failure to label correctly will result in recollection of urine specimen.    STEP 6  Return each jug within 24 hours after final urination.     STEP 7  Drop off jug locations:   [facility]: Mon-Fri 7am-7pm - Closed on  weekends  [facility]: Mon-Fri 7am-5pm - Closed on Sunday  [facility]: Mon-Fri 7am-6:30pm - Closed on weekends    STEP 8  Please call [facility] after return of your final jug to schedule your follow-up visit. [phone]

## 2021-06-10 NOTE — TELEPHONE ENCOUNTER
Called Daniel, apologized that he is still frustrated with ongoing pain without answers.    Recommend evaluation of GI, possible CCK to evaluate for biliary dyskinesia?     Laina Banda, DO

## 2021-06-12 NOTE — PROGRESS NOTES
Clinic Care Coordination Contact  Community Health Worker Initial Outreach    Patient accepts CC: No, no needs at this time. Patient will be sent Care Coordination introduction letter for future reference.

## 2021-06-15 NOTE — TELEPHONE ENCOUNTER
I have met patient once, he has since been seen in [facility] system.   We have no ACTs on file and I haven't previously prescribed his asthma meds.   Can we schedule him for annual physical exam + med check? Alternatively video visit for asthma?  Laina Banda, DO

## 2021-06-15 NOTE — PROGRESS NOTES
Name: Daniel Malhotra  : 1975   MRN: [id number]    ASSESSMENT & PLAN:   Daniel Malhotra is a 45 y.o. male presenting today for refill of his asthma medications. Daniel has sought care from many different clinics and providers. I am his assigned PCP in the [facility] system and have seen him for 2 other visits in 2020 for evaluation of abdominal pain. We have never discussed asthma before and it is not on his current problem list. He called in requesting refill of his asthma medications and I recommended either a virtual visit for asthma follow up for an in person visit for annual physical exam + med check. He declines any preventive services today. He is frustrated with feeling nickel and dimed by the healthcare industry.     Of note, he has a history of elevated blood sugars and glucosuria. No prior A1cs documented. I did recommend obtaining A1c today but patient does not want to do labs and states this has been evaluated in the past. I am not able to find prior A1c results in [facility] or our Epic.     1. Mild intermittent asthma with acute exacerbation  - budesonide-formoteroL (SYMBICORT) 80-4.5 mcg/actuation inhaler; Inhale 2 puffs 2 (two) times a day as needed.  Dispense: 1 Inhaler; Refill: 11  - albuterol (PROAIR HFA;PROVENTIL HFA;VENTOLIN HFA) 90 mcg/actuation inhaler; Inhale 1-2 Puffs by mouth every 4 hours if needed.  Dispense: 1 Inhaler; Refill: 5    Asthma diagnosis from childhood. No prior PFTs available for review. Asthma triggers include URIs and allergies. ACT is currently uncontrolled. Patient states this is due to recent URI symptoms.     Daniel uses his ICS as needed with flares and states when not ill, he doesn't need to regularly use ICS to maintain control of breathing. We will refill ICS to be used twice daily as needed and albuterol as needed for asthma flares.     HM: Declines any preventive health maintenance today.    Return in about 1 month (around 4/3/2021)  for follow up asthma.    Laina Banda DO  [facility]          Daniel Malhotra is a 45 y.o. male presenting to discuss the following:     CC:   Chief Complaint   Patient presents with     Medication Management     Need refill on inhalers       HPI:  ACT: 11/25    Diagnosed with asthma when he was 12 yo. Picks up inhaler every 3-4 years.   Triggers: URIs, sometimes allergies. Not responsive to temperature changes.   Has been sick over the last week, so score is reflective of illness.   Usually uses his albuterol once a week. During last illness, was using albuterol and wasn't working, when he started the symbicort everything opened up and was working better.      Uses Allegra when allergies flare and flonase as well.     States he has glucosuria when he has kidney stones. States he has been worked up for diabetes.    States he has had multiple tetanus boosters in the history due to several cuts.  Thinks he has had several tetanus boosters and has had one within the last 10 years but is not sure.  Declines flu shot today.  Declines lipid screen today.  Declines weaning for diabetes.    ROS:   LUNGS: Not acutely short of breath. Feels wheezy, breathing triggers cough. No chest tightness.     PMH:   Patient Active Problem List   Diagnosis     Calculus of kidney     Colon polyps     Generalized abdominal pain     Glycosuria       Past Medical History:   Diagnosis Date     Gout      Kidney stone        PSH:   Past Surgical History:   Procedure Laterality Date     TX CYSTO/URETERO W/LITHOTRIPSY &INDWELL STENT INSRT Right 3/22/2018    Procedure: CYSTOSCOPY, RIGHT URETEROSCOPY WITH LASER LITHOTRIPSY AND STENT;  Surgeon: Antonio Blake MD;  Location: [facility] OR;  Service: Urology     TX CYSTOURETHROSCOPY,URETER CATHETER Right 3/22/2018    Procedure: CYSTOSCOPY, WITH RETROGRADE PYELOGRAM;  Surgeon: Antonio Blake MD;  Location: [facility] OR;  Service: Urology  "        MEDICATIONS:   Current Outpatient Medications on File Prior to Visit   Medication Sig Dispense Refill     albuterol (PROAIR HFA;PROVENTIL HFA;VENTOLIN HFA) 90 mcg/actuation inhaler Inhale 1-2 Puffs by mouth every 4 hours if needed.       dimenhyDRINATE (DRAMAMINE) 50 MG tablet Take 50 mg by mouth at bedtime as needed.       ibuprofen (IBUPROFEN IB) 200 MG tablet Take 200 mg by mouth every 6 (six) hours as needed for pain.       metoclopramide (REGLAN) 10 MG tablet Take 1 tablet (10 mg total) by mouth 3 (three) times a day as needed for nausea. 30 tablet 0     pantoprazole (PROTONIX) 20 MG tablet Take 1 tablet (20 mg total) by mouth daily. 20 tablet 0     SYMBICORT 80-4.5 mcg/actuation inhaler Inhale 2 puffs 2 (two) times a day.       UNABLE TO FIND Med Name:Digestive Optimizer by [employer]       No current facility-administered medications on file prior to visit.        ALLERGIES:  No Known Allergies    FAMHx:  Family History   Problem Relation Age of Onset     Urolithiasis Neg Hx      Clotting disorder Neg Hx      Gout Neg Hx        SOCIAL HISTORY:   Social History     Tobacco Use     Smoking status: Current Some Day Smoker     Smokeless tobacco: Never Used   Substance Use Topics     Alcohol use: Yes     Comment: 3 times a week     Drug use: Yes     Frequency: 7.0 times per week     Types: Marijuana         PHYSICAL EXAM:   /81 (Patient Site: Left Arm, Patient Position: Sitting, Cuff Size: Adult Large)   Pulse 74   Resp 16   Ht 6' 1\" (1.854 m)   Wt 203 lb 12.8 oz (92.4 kg)   BMI 26.89 kg/m     GENERAL: Daniel is a disgruntled male, in no acute distress.  He appears a healthy weight.  HEENT: Sclera white, no cervical lymphadenopathy.  HEART: Regular rate and rhythm, no murmurs.  LUNGS: Respirations are unlabored.  Slight squeak right lower lung base, otherwise clear.  No wheezing, no crackles.    REVIEW OF PREVIOUS RESULTS:   History greater than 1000 milligrams per deciliter glucose on UA in " October 2020.  Several glucose readings in the 160s and 170s, most recent 105.  Unclear if these are fasting or random.  No prior A1c's available in our health records or in care everywhere.

## 2021-06-16 PROBLEM — R81 GLYCOSURIA: Status: ACTIVE | Noted: 2020-10-15

## 2021-06-16 PROBLEM — J45.21 MILD INTERMITTENT ASTHMA WITH ACUTE EXACERBATION: Status: ACTIVE | Noted: 2021-03-03

## 2021-06-16 PROBLEM — K63.5 COLON POLYPS: Status: ACTIVE | Noted: 2017-10-02

## 2021-06-16 PROBLEM — R10.84 GENERALIZED ABDOMINAL PAIN: Status: ACTIVE | Noted: 2020-10-15

## 2021-06-16 NOTE — ANESTHESIA PREPROCEDURE EVALUATION
"Anesthesia Evaluation      Patient summary reviewed   No history of anesthetic complications     Airway   Mallampati: I  Neck ROM: full   Pulmonary - normal exam   (+) a smoker                         Cardiovascular - normal exam   Neuro/Psych - negative ROS     Endo/Other       GI/Hepatic/Renal       Other findings: H/o elevated LFTs per H & P, no labs in Epic. Smoke.  GERD, not daily but \"a lot\", not on meds for it.  Nephrolithiasis.  Denies SOB or ROLLINS.        Dental - normal exam                        Anesthesia Plan  Planned anesthetic: general endotracheal    ASA 2   Induction: intravenous   Anesthetic plan and risks discussed with: patient    Post-op plan: routine recovery  DNR/DNI status   DNR/DNI status discussed with patient.  Plan is for suspension of DNR        "

## 2021-06-16 NOTE — TELEPHONE ENCOUNTER
Left message to call back for: Update ACT  Information to relay to patient:  Please go through ACT questions with patient

## 2021-06-16 NOTE — TELEPHONE ENCOUNTER
It appears this is a follow-up to make sure his ACT is now normal as it was abnormal when last checked.

## 2021-06-16 NOTE — TELEPHONE ENCOUNTER
----- Message from Laina Banda DO sent at 3/3/2021  8:37 PM CST -----  Please call Daniel and complete ACT to follow up asthma.  Thank you!  Laina Banda DO

## 2021-06-16 NOTE — TELEPHONE ENCOUNTER
Left message to call back for: Update ACT  Information to relay to patient:  LMTCB, Please go over ACT questions with patient

## 2021-06-16 NOTE — ANESTHESIA CARE TRANSFER NOTE
Last vitals:   Vitals:    03/22/18 1648   BP: (!) 168/95   Pulse: 91   Resp: 16   Temp: 36.3  C (97.4  F)   SpO2: 100%     Patient's level of consciousness is drowsy  Spontaneous respirations: yes  Maintains airway independently: yes  Dentition unchanged: yes  Oropharynx: oropharynx clear of all foreign objects    QCDR Measures:  ASA# 20 - Surgical Safety Checklist: WHO surgical safety checklist completed prior to induction  PQRS# 430 - Adult PONV Prevention: 4558F - Pt received => 2 anti-emetic agents (different classes) preop & intraop  ASA# 8 - Peds PONV Prevention: NA - Not pediatric patient, not GA or 2 or more risk factors NOT present  PQRS# 424 - Pamela-op Temp Management: 4559F - At least one body temp DOCUMENTED => 35.5C or 95.9F within required timeframe  PQRS# 426 - PACU Transfer Protocol: - Transfer of care checklist used  ASA# 14 - Acute Post-op Pain: ASA14B - Patient did NOT experience pain >= 7 out of 10

## 2021-06-16 NOTE — TELEPHONE ENCOUNTER
Pt called back- attempted to transfer to [facility] but they were unavailable    Pt states that he is unsure why he has to do this as he only uses his inhaler when he is sick.  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

## 2021-06-16 NOTE — ANESTHESIA POSTPROCEDURE EVALUATION
Patient: Daniel Malhorta  CYSTOSCOPY, RIGHT URETEROSCOPY WITH LASER LITHOTRIPSY AND STENT, CYSTOSCOPY, WITH RETROGRADE PYELOGRAM  Anesthesia type: general    Patient location: PACU  Last vitals:   Vitals:    03/22/18 1730   BP: (!) 131/94   Pulse: 78   Resp: 16   Temp:    SpO2: 97%     Post vital signs: stable  Level of consciousness: awake and responds to simple questions  Post-anesthesia pain: pain controlled  Post-anesthesia nausea and vomiting: no  Pulmonary: unassisted, return to baseline  Cardiovascular: stable and blood pressure at baseline  Hydration: adequate  Anesthetic events: no    QCDR Measures:  ASA# 11 - Pamela-op Cardiac Arrest: ASA11B - Patient did NOT experience unanticipated cardiac arrest  ASA# 12 - Pamela-op Mortality Rate: ASA12B - Patient did NOT die  ASA# 13 - PACU Re-Intubation Rate: ASA13B - Patient did NOT require a new airway mgmt  ASA# 10 - Composite Anes Safety: ASA10A - No serious adverse event    Additional Notes:

## 2021-06-17 NOTE — TELEPHONE ENCOUNTER
Informed patient of Dr. Banda's message below. Pt very upset that he needs to schedule a follow up appointment for his asthma.  Attempted to help patient schedule but patient upset about having to complete ACT and hung up on me.

## 2021-06-17 NOTE — TELEPHONE ENCOUNTER
This patient was very annoyed about needing to come into the clinic for an inhaler refill in the first place, so I am not surprised by his response. I am trying to be respectful of his finances and minimize unnecessary visits to clinic by following up on his asthma by telephone without a bill. Part of managing asthma is ensuring his asthma is under good control, and we measure this by trending ACT scores.     Maybe see if he would like to schedule a telephone or office visit to follow up instead?    Laina Banda, DO

## 2021-06-21 NOTE — LETTER
Letter by Laina Banda DO at      Author: Laina Banda DO Service: -- Author Type: --    Filed:  Encounter Date: 4/5/2021 Status: (Other)         May 19, 2021       Patient: Daniel Malhotra   MR Number: [id number]   YOB: 1975   Date of Visit: 4/5/2021       Dear Daniel,    We care about your healthcare and want to provide high quality care. Based on your last asthma control test, your asthma is not well controlled. We would like to update your asthma control test after refilling your albuterol inhaler to ensure that your asthma is now under good control. If your score continues to suggest poor control, we will arrange a follow up to discuss options to improve your asthma control. At your convenience, please complete the provided ACT score and return to our clinic.    Sincerely,        Laina Banda DO

## 2021-06-21 NOTE — LETTER
Letter by Nayeli Rodriguez CHW at      Author: Nayeli Rodriguez CHW Service: -- Author Type: --    Filed:  Encounter Date: 10/20/2020 Status: (Other)       CARE COORDINATION  [id number] [place] [address]     October 20, 2020    Daniel Malhotra  [address] [place] [id number]      Dear Daniel,    I am a clinic community health worker who works with Laina Banda DO at [facility]. I wanted to thank you for spending the time to talk with me.  Below is a description of clinic care coordination and how I can further assist you.      The clinic care coordination team is made up of a registered nurse,  and community health worker who understand the health care system. The goal of clinic care coordination is to help you manage your health and improve access to the health care system in the most efficient manner. The team can assist you in meeting your health care goals by providing education, coordinating services, strengthening the communication among your providers and supporting you with any resource needs.    Please feel free to contact me at [phone] with any questions or concerns. We are focused on providing you with the highest-quality healthcare experience possible and that all starts with you.     Sincerely,     RUDY Hutton  Clinic Care Coordination   [phone]  [email or web address]

## 2022-01-18 VITALS
DIASTOLIC BLOOD PRESSURE: 88 MMHG | HEIGHT: 73 IN | HEART RATE: 80 BPM | WEIGHT: 192 LBS | SYSTOLIC BLOOD PRESSURE: 130 MMHG | BODY MASS INDEX: 25.45 KG/M2 | TEMPERATURE: 99.1 F

## 2022-01-18 VITALS
RESPIRATION RATE: 16 BRPM | HEART RATE: 74 BPM | BODY MASS INDEX: 27.01 KG/M2 | HEIGHT: 73 IN | WEIGHT: 203.8 LBS | SYSTOLIC BLOOD PRESSURE: 124 MMHG | DIASTOLIC BLOOD PRESSURE: 81 MMHG

## 2022-12-08 DIAGNOSIS — J45.21 MILD INTERMITTENT ASTHMA WITH ACUTE EXACERBATION: ICD-10-CM

## 2022-12-09 NOTE — TELEPHONE ENCOUNTER
Patient last seen March 2021. Please see if patient is still a patient of [facility]. If yes, please schedule for physical + med check. Please also obtain an updated ACT. When appointment scheduled and ACT obtained, route message back to me.  Laina Banda, DO

## 2022-12-09 NOTE — TELEPHONE ENCOUNTER
"Routing refill request to provider for review/approval because:  ACT score out of date/not on file.  ACT score out of range.  Patient needs to be seen because it has been more than 6 months since last office visit.    Last Written Prescription Date:  3/3/21  Last Fill Quantity: 1,  # refills: 11   Last office visit provider:  3/3/21     Last Written Prescription Date:  3/3/21  Last Fill Quantity: 1,  # refills: 5   Last office visit provider:  3/3/21    Requested Prescriptions   Pending Prescriptions Disp Refills     SYMBICORT 80-4.5 MCG/ACT Inhaler [Pharmacy Med Name: SYMBICORT 80/4.5MCG (120  ORAL INH)] 10.2 g      Sig: INHALE 2 PUFFS BY MOUTH TWICE DAILY AS NEEDED       Inhaled Steroids Protocol Failed - 12/9/2022  9:03 AM        Failed - Asthma control assessment score within normal limits in last 6 months     Please review ACT score.           Failed - Recent (6 mo) or future (30 days) visit within the authorizing provider's specialty     Patient had office visit in the last 6 months or has a visit in the next 30 days with authorizing provider or within the authorizing provider's specialty.  See \"Patient Info\" tab in inbasket, or \"Choose Columns\" in Meds & Orders section of the refill encounter.            Passed - Patient is age 12 or older        Passed - Medication is active on med list       Long-Acting Beta Agonist Inhalers Protocol  Failed - 12/9/2022  9:03 AM        Failed - Asthma control assessment score within normal limits in last 6 months     Please review ACT score.           Failed - Recent (6 mo) or future (30 days) visit within the authorizing provider's specialty     Patient had office visit in the last 6 months or has a visit in the next 30 days with authorizing provider or within the authorizing provider's specialty.  See \"Patient Info\" tab in inbasket, or \"Choose Columns\" in Meds & Orders section of the refill encounter.            Passed - Patient is age 12 or older        Passed - Order " "for Serevent, Striverdi, or Foradil and pt has steroid inhaler        Passed - Medication is active on med list           albuterol (PROAIR HFA/PROVENTIL HFA/VENTOLIN HFA) 108 (90 Base) MCG/ACT inhaler [Pharmacy Med Name: ALBUTEROL HFA INH (200 PUFFS) 6.7GM] 6.7 g      Sig: INHALE 1 TO 2 PUFFS BY MOUTH EVERY 4 HOURS AS NEEDED       Asthma Maintenance Inhalers - Anticholinergics Failed - 12/8/2022 10:41 AM        Failed - Asthma control assessment score within normal limits in last 6 months     Please review ACT score.           Failed - Recent (6 mo) or future (30 days) visit within the authorizing provider's specialty     Patient had office visit in the last 6 months or has a visit in the next 30 days with authorizing provider or within the authorizing provider's specialty.  See \"Patient Info\" tab in inbasket, or \"Choose Columns\" in Meds & Orders section of the refill encounter.            Passed - Patient is age 12 years or older        Passed - Medication is active on med list       Short-Acting Beta Agonist Inhalers Protocol  Failed - 12/8/2022 10:41 AM        Failed - Asthma control assessment score within normal limits in last 6 months     Please review ACT score.           Failed - Recent (6 mo) or future (30 days) visit within the authorizing provider's specialty     Patient had office visit in the last 6 months or has a visit in the next 30 days with authorizing provider or within the authorizing provider's specialty.  See \"Patient Info\" tab in inbasket, or \"Choose Columns\" in Meds & Orders section of the refill encounter.            Passed - Patient is age 12 or older        Passed - Medication is active on med list             Brent Wade RN 12/09/22 9:04 AM  "

## 2022-12-13 ENCOUNTER — NURSE TRIAGE (OUTPATIENT)
Dept: NURSING | Facility: CLINIC | Age: 47
End: 2022-12-13

## 2022-12-13 DIAGNOSIS — J45.21 MILD INTERMITTENT ASTHMA WITH ACUTE EXACERBATION: ICD-10-CM

## 2022-12-13 RX ORDER — ALBUTEROL SULFATE 90 UG/1
AEROSOL, METERED RESPIRATORY (INHALATION)
Qty: 18 G | Refills: 0 | Status: SHIPPED | OUTPATIENT
Start: 2022-12-13 | End: 2023-05-04

## 2022-12-13 ASSESSMENT — ASTHMA QUESTIONNAIRES
QUESTION_4 LAST FOUR WEEKS HOW OFTEN HAVE YOU USED YOUR RESCUE INHALER OR NEBULIZER MEDICATION (SUCH AS ALBUTEROL): NOT AT ALL
ACUTE_EXACERBATION_TODAY: YES
QUESTION_1 LAST FOUR WEEKS HOW MUCH OF THE TIME DID YOUR ASTHMA KEEP YOU FROM GETTING AS MUCH DONE AT WORK, SCHOOL OR AT HOME: MOST OF THE TIME
QUESTION_3 LAST FOUR WEEKS HOW OFTEN DID YOUR ASTHMA SYMPTOMS (WHEEZING, COUGHING, SHORTNESS OF BREATH, CHEST TIGHTNESS OR PAIN) WAKE YOU UP AT NIGHT OR EARLIER THAN USUAL IN THE MORNING: FOUR OR MORE NIGHTS A WEEK
ACT_TOTALSCORE: 11
QUESTION_2 LAST FOUR WEEKS HOW OFTEN HAVE YOU HAD SHORTNESS OF BREATH: MORE THAN ONCE A DAY
QUESTION_5 LAST FOUR WEEKS HOW WOULD YOU RATE YOUR ASTHMA CONTROL: POORLY CONTROLLED
ACT_TOTALSCORE: 11

## 2022-12-13 NOTE — TELEPHONE ENCOUNTER
Thank you for completing a CT.  Asthma remains poorly controlled.  Albuterol refill sent to pharmacy.  Patient needs to follow-up in clinic to optimize his asthma control.    1. Mild intermittent asthma with acute exacerbation  - albuterol (PROAIR HFA/PROVENTIL HFA/VENTOLIN HFA) 108 (90 Base) MCG/ACT inhaler; [ALBUTEROL (PROAIR HFA;PROVENTIL HFA;VENTOLIN HFA) 90 MCG/ACTUATION INHALER] Inhale 1-2 Puffs by mouth every 4 hours if needed. Strength: 108 (90 Base) MCG/ACT  Dispense: 18 g; Refill: 0     Laina Banda,

## 2022-12-13 NOTE — TELEPHONE ENCOUNTER
Worked with patient to fill out ACT:    ACT Total Scores 3/4/2021 12/13/2022   ACT TOTAL SCORE (Goal Greater than or Equal to 20) 11 11   In the past 12 months, how many times did you visit the emergency room for your asthma without being admitted to the hospital? 0 0   In the past 12 months, how many times were you hospitalized overnight because of your asthma? 0 0     Provided number for central scheduling and also info for [facility].    Please send albuterol to [facility] :)    Adalberto Barraza RN     [facility]

## 2022-12-13 NOTE — TELEPHONE ENCOUNTER
"Nurse Triage SBAR    Is this a 2nd Level Triage? NO    Situation: anxiety and multiple issues    Background:   Pt states he's had flare up of kidney stones in the past week which caused his anxiety.     FNA proceeded to ask questions about his symptoms. Pt had mentioned multiple issues and FNA asked more questions to clarify. Apparently FNA assessing over the phone made him more upset as he verbalized.    Assessment:    Pt states his flare up of kidney stones have \"thrown him off physically\" and caused him to have more anxiety.  FNA proceeded to ask questions about kidney stone concern, pt explained he has recurrent kidney stones for 20 years, and just vaguely repeated he had a flare up last week and he passed kidney stones last year. No flank pain today, no nausea. Took Gas X, heartburn meds, Tylenol and ibuprofen and has been pain free since yesterday.    Pt then mentioned about trying to get an albuterol inhaler which he had since he was a child, and how difficult it is to get a refill. Since this was mentioned, FNA just clarified whether he likes to get a refill. Then he verbalized that he would like to get seen and that the questions I have are making him more anxious.    \"I have trouble breathing 24/7 even at rest and this is going on for 1 week, and I'd like to be seen for this. I was throwing up for 12 hours and was sick.\"    Protocol Recommended Disposition:   ED per protocol    Recommendation:   Pt declined coming in to ED, \"I'll waste money and have them tell me to see my doctor.\" FNA advised that I'll send a message to PCP and request for recommendation for next steps.    Pt scheduled for annual physical in Feb 2023.    Routed to provider    Does the patient meet one of the following criteria for ADS visit consideration? 16+ years old, with an [facility] PCP     TIP  Providers, please consider if this condition is appropriate for management at one of our Acute and Diagnostic Services sites.     If patient is " a good candidate, please use dotphrase <dot>triageresponse and select Refer to ADS to document.    Ana Hayward RN/[place] Nurse Advisor        Has not been very painful lately    Gas X and heartburn medicine  ibuprfen and Tylenol that all seems to be okay    Hard time eating bread    Last time I passed them over a year ago    No flank poain, 2 days ago feeling the pain    SOB x 5 days     Yesterday  Inhaler      Reason for Disposition    Difficulty breathing and persists > 10 minutes and not relieved by reassurance provided by triager    Additional Information    Negative: SEVERE difficulty breathing (e.g., struggling for each breath, speaks in single words)    Negative: Bluish (or gray) lips or face    Negative: Difficult to awaken or acting confused (e.g., disoriented, slurred speech)    Negative: Hysterical or combative behavior    Negative: Sounds like a life-threatening emergency to the triager    Negative: Chest pain    Negative: Palpitations, skipped heart beat, or rapid heart beat    Negative: Cough is main symptom    Negative: Suicide thoughts, threats, attempts, or questions    Negative: Depression is main problem or symptom (e.g., feelings of sadness or hopelessness)    Protocols used: ANXIETY AND PANIC ATTACK-A-OH

## 2022-12-13 NOTE — TELEPHONE ENCOUNTER
Patient needs to be seen to help address his symptoms.  Offer available clinic appointment versus walk-in care.    Laina Banda, DO

## 2022-12-14 NOTE — TELEPHONE ENCOUNTER
Pt returned call, please call pt back to go over ACT.  Pt informed that we will be giving him a call back so be sure to answer call in order to complete ACT

## 2022-12-14 NOTE — TELEPHONE ENCOUNTER
"Called and informed patient of refill, need for appointment. Patient stated understanding. Patient does have an appointment scheduled for 2/16/23 for \"asthma and physical\".    Vidhya Ovalle RN    "

## 2023-03-08 RX ORDER — DILTIAZEM HYDROCHLORIDE 60 MG/1
TABLET, FILM COATED ORAL
Qty: 10.2 G | Refills: 0 | OUTPATIENT
Start: 2023-03-08

## 2023-03-08 RX ORDER — ALBUTEROL SULFATE 90 UG/1
AEROSOL, METERED RESPIRATORY (INHALATION)
Qty: 6.7 G | Refills: 0 | OUTPATIENT
Start: 2023-03-08

## 2023-05-04 ENCOUNTER — OFFICE VISIT (OUTPATIENT)
Dept: FAMILY MEDICINE | Facility: CLINIC | Age: 48
End: 2023-05-04
Payer: COMMERCIAL

## 2023-05-04 VITALS
BODY MASS INDEX: 26.7 KG/M2 | OXYGEN SATURATION: 96 % | HEIGHT: 72 IN | DIASTOLIC BLOOD PRESSURE: 125 MMHG | WEIGHT: 197.13 LBS | HEART RATE: 101 BPM | TEMPERATURE: 98.7 F | SYSTOLIC BLOOD PRESSURE: 178 MMHG | RESPIRATION RATE: 16 BRPM

## 2023-05-04 DIAGNOSIS — Z00.00 ANNUAL PHYSICAL EXAM: Primary | ICD-10-CM

## 2023-05-04 DIAGNOSIS — R03.0 ELEVATED BLOOD PRESSURE READING WITHOUT DIAGNOSIS OF HYPERTENSION: ICD-10-CM

## 2023-05-04 DIAGNOSIS — J45.21 MILD INTERMITTENT ASTHMA WITH ACUTE EXACERBATION: ICD-10-CM

## 2023-05-04 DIAGNOSIS — R22.9 SUBCUTANEOUS MASS: ICD-10-CM

## 2023-05-04 PROBLEM — R10.84 GENERALIZED ABDOMINAL PAIN: Status: RESOLVED | Noted: 2020-10-15 | Resolved: 2023-05-04

## 2023-05-04 PROBLEM — R74.8 ELEVATED LIVER ENZYMES: Status: ACTIVE | Noted: 2017-09-28

## 2023-05-04 PROBLEM — R74.8 ELEVATED LIVER ENZYMES: Status: RESOLVED | Noted: 2017-09-28 | Resolved: 2023-05-04

## 2023-05-04 PROBLEM — N20.0 CALCULUS OF KIDNEY: Status: ACTIVE | Noted: 2020-07-31

## 2023-05-04 PROCEDURE — 99213 OFFICE O/P EST LOW 20 MIN: CPT | Mod: 25 | Performed by: FAMILY MEDICINE

## 2023-05-04 PROCEDURE — 99396 PREV VISIT EST AGE 40-64: CPT | Performed by: FAMILY MEDICINE

## 2023-05-04 RX ORDER — ALBUTEROL SULFATE 90 UG/1
AEROSOL, METERED RESPIRATORY (INHALATION)
Qty: 18 G | Refills: 3 | Status: SHIPPED | OUTPATIENT
Start: 2023-05-04

## 2023-05-04 RX ORDER — BUDESONIDE AND FORMOTEROL FUMARATE DIHYDRATE 80; 4.5 UG/1; UG/1
2 AEROSOL RESPIRATORY (INHALATION) 2 TIMES DAILY PRN
Qty: 30.6 G | Refills: 3 | Status: SHIPPED | OUTPATIENT
Start: 2023-05-04

## 2023-05-04 ASSESSMENT — ENCOUNTER SYMPTOMS
COUGH: 0
CHILLS: 0
DIZZINESS: 0
NERVOUS/ANXIOUS: 0
HEMATOCHEZIA: 0
PALPITATIONS: 0
ARTHRALGIAS: 1
CONSTIPATION: 0
DIARRHEA: 0
SORE THROAT: 0
FEVER: 0
MYALGIAS: 0
WEAKNESS: 1
HEADACHES: 0
ABDOMINAL PAIN: 0
FREQUENCY: 0
SHORTNESS OF BREATH: 1
NAUSEA: 0
JOINT SWELLING: 1
EYE PAIN: 0
DYSURIA: 0
PARESTHESIAS: 0
HEARTBURN: 0
HEMATURIA: 0

## 2023-05-04 ASSESSMENT — ASTHMA QUESTIONNAIRES
QUESTION_5 LAST FOUR WEEKS HOW WOULD YOU RATE YOUR ASTHMA CONTROL: SOMEWHAT CONTROLLED
QUESTION_2 LAST FOUR WEEKS HOW OFTEN HAVE YOU HAD SHORTNESS OF BREATH: THREE TO SIX TIMES A WEEK
QUESTION_3 LAST FOUR WEEKS HOW OFTEN DID YOUR ASTHMA SYMPTOMS (WHEEZING, COUGHING, SHORTNESS OF BREATH, CHEST TIGHTNESS OR PAIN) WAKE YOU UP AT NIGHT OR EARLIER THAN USUAL IN THE MORNING: NOT AT ALL
QUESTION_4 LAST FOUR WEEKS HOW OFTEN HAVE YOU USED YOUR RESCUE INHALER OR NEBULIZER MEDICATION (SUCH AS ALBUTEROL): ONE OR TWO TIMES PER DAY
ACT_TOTALSCORE: 18
ACT_TOTALSCORE: 18
QUESTION_1 LAST FOUR WEEKS HOW MUCH OF THE TIME DID YOUR ASTHMA KEEP YOU FROM GETTING AS MUCH DONE AT WORK, SCHOOL OR AT HOME: NONE OF THE TIME

## 2023-05-04 NOTE — PROGRESS NOTES
SUBJECTIVE:   CC: Daniel is an 47 year old who presents for preventative health visit.     Chief Complaints and History of Present Illnesses   Patient presents with     Physical            5/4/2023     2:26 PM   Additional Questions   Roomed by Jeanne BACK CMA   Accompanied by Self       Patient has been advised of split billing requirements and indicates understanding: Yes  Healthy Habits:     Getting at least 3 servings of Calcium per day:  Yes    Bi-annual eye exam:  NO    Dental care twice a year:  NO    Sleep apnea or symptoms of sleep apnea:  None and Excessive snoring    Diet:  Regular (no restrictions)    Frequency of exercise:  1 day/week    Duration of exercise:  15-30 minutes    Taking medications regularly:  Yes    Medication side effects:  Not applicable    PHQ-2 Total Score: 0    Additional concerns today:  Yes    EPIGASTRIC LUMP: Present for a few years, concerned that it is changing.     HEALTH MAINTENANCE:   - HIV: previously screened, no new risk factors    - HEP C: previously screened, no new risk factors.    - LIPID: recent lab draws, will defer today.     - COVID: declines  - PCV: will learn more about it    - TDAP: thinks up to date    - HEP B: will review handout       ASTHMA: Breathing improved with Symbicort. Hasn't done updated PFTs.       3/4/2021     9:00 AM 12/13/2022     1:00 PM 5/4/2023     2:37 PM   ACT Total Scores   ACT TOTAL SCORE (Goal Greater than or Equal to 20) 11 11 18   In the past 12 months, how many times did you visit the emergency room for your asthma without being admitted to the hospital? 0 0 0   In the past 12 months, how many times were you hospitalized overnight because of your asthma? 0 0 0     URINE GLUCOSE: Reports has been evaluated for this with kidney stones.        KIDNEY STONE: Recent surgery to remove stones, improved. Seeing someone through .   Stones for the last 25 years.       HX COLON POLYP: Last colonoscopy September 2020, due for repeat 3 years -  September 2023.     BLOOD PRESSURE: Reports history of borderline pressures.  Has used a home cuff and better then. Will check and get back to us.  BP Readings from Last 6 Encounters:   05/04/23 (!) 178/125   03/03/21 124/81   08/12/20 130/88       TOBACCO: Still using, has quit in the past. Not ready yet.      Today's PHQ-2 Score:       5/4/2023     2:35 PM   PHQ-2 ( 1999 [employer])   Q1: Little interest or pleasure in doing things 0   Q2: Feeling down, depressed or hopeless 0   PHQ-2 Score 0   Q1: Little interest or pleasure in doing things Not at all    Not at all   Q2: Feeling down, depressed or hopeless Not at all    Not at all   PHQ-2 Score 0    0       Have you ever done Advance Care Planning? (For example, a Health Directive, POLST, or a discussion with a medical provider or your loved ones about your wishes): No, advance care planning information given to patient to review.  Patient declined advance care planning discussion at this time.    Social History     Tobacco Use     Smoking status: Some Days     Types: Cigarettes     Smokeless tobacco: Never   Vaping Use     Vaping status: Never Used   Substance Use Topics     Alcohol use: Yes     Comment: Alcoholic Drinks/day: 3 times a week             5/4/2023     2:35 PM   Alcohol Use   Prescreen: >3 drinks/day or >7 drinks/week? No     Last PSA: No results found for: PSA    Reviewed orders with patient. Reviewed health maintenance and updated orders accordingly - Yes      Reviewed and updated as needed this visit by clinical staff   Tobacco  Allergies  Meds              Reviewed and updated as needed this visit by Provider   Tobacco  Allergies  Meds  Problems  Med Hx  Surg Hx  Fam Hx             Review of Systems   Constitutional: Negative for chills and fever.   HENT: Positive for congestion. Negative for ear pain, hearing loss and sore throat.    Eyes: Positive for visual disturbance. Negative for pain.   Respiratory: Positive for shortness of breath.  "Negative for cough.    Cardiovascular: Positive for chest pain. Negative for palpitations and peripheral edema.   Gastrointestinal: Negative for abdominal pain, constipation, diarrhea, heartburn, hematochezia and nausea.   Genitourinary: Negative for dysuria, frequency, genital sores, hematuria, impotence, penile discharge and urgency.   Musculoskeletal: Positive for arthralgias and joint swelling. Negative for myalgias.   Skin: Negative for rash.   Neurological: Positive for weakness. Negative for dizziness, headaches and paresthesias.   Psychiatric/Behavioral: Negative for mood changes. The patient is not nervous/anxious.      CONGESTION: sinus pressure/congestion, feels in head, develops headache. Improves with nasal spray and allergy pills as needed.     VISUAL DISTURBANCE: using readers, recent changes      SHORTNESS OF BREATH: asthma, improves with the symbicort      CHEST PAIN: denies exertional chest pain, more the lump giuliano nisha.      JOINT PAIN/SWELLING: status quo, no major concerns. Recent injection with foot. Ankles/knees get sore with work.     WEAKNESS: no major concerns.     OBJECTIVE:   BP (!) 178/125 (BP Location: Left arm, Patient Position: Sitting, Cuff Size: Adult Regular)   Pulse 101   Temp 98.7  F (37.1  C) (Oral)   Resp 16   Ht 1.835 m (6' 0.25\")   Wt 89.4 kg (197 lb 2 oz)   SpO2 96%   BMI 26.55 kg/m      Physical Exam  GENERAL: healthy, alert and no distress  EYES: Eyes grossly normal to inspection, PERRL and conjunctivae and sclerae normal  HENT: ear canals and TM's normal, nose and mouth without ulcers or lesions  NECK: no adenopathy, no asymmetry, masses, or scars and thyroid normal to palpation  RESP: lungs clear to auscultation - no rales, rhonchi or wheezes  CV: regular rate and rhythm, normal S1 S2, no S3 or S4, no murmur, click or rub, no peripheral edema and peripheral pulses strong  ABDOMEN: soft, nontender, no hepatosplenomegaly, bowel sounds normal.  Epigastric superficial " mass, nontender, no fluctuance, lesion approximately 2 cm in diameter.  MS: no gross musculoskeletal defects noted, no edema  SKIN: no suspicious lesions or rashes  NEURO: Normal strength and tone, mentation intact and speech normal  PSYCH: mentation appears normal, affect normal/bright        ASSESSMENT/PLAN:     1. Annual physical exam  - REVIEW OF HEALTH MAINTENANCE PROTOCOL ORDERS    Reviewed health history and health maintenance recommendations.  Not ready to quit smoking.  We can send in prescriptions if interested when ready.  He may call or send bMenu message if desires.  Discussed lipid screening, lots of recent lab draws and prefers to defer any pokes today.  We will plan to check at next physical.  History of glucosuria, reports blood sugar levels have been normal.  Would consider A1c with next labs.    2. Mild intermittent asthma with acute exacerbation  - albuterol (PROAIR HFA/PROVENTIL HFA/VENTOLIN HFA) 108 (90 Base) MCG/ACT inhaler; [ALBUTEROL (PROAIR HFA;PROVENTIL HFA;VENTOLIN HFA) 90 MCG/ACTUATION INHALER] Inhale 1-2 Puffs by mouth every 4 hours if needed. Strength: 108 (90 Base) MCG/ACT  Dispense: 18 g; Refill: 3  - budesonide-formoterol (SYMBICORT) 80-4.5 MCG/ACT Inhaler; Inhale 2 puffs into the lungs 2 times daily as needed (asthma)  Dispense: 30.6 g; Refill: 3    ACT not controlled but sparingly using Symbicort.  Will refill Symbicort, continue albuterol as needed, recheck ACT in a little over a month.  Will reach out to patient to recheck ACT.    3. Elevated blood pressure reading without diagnosis of hypertension  - Home Blood Pressure Monitor Order for DME - ONLY FOR DME    Blood pressure elevated today however whitecoat hypertension.  Reports home readings have been fine.  He no longer has a cuff at home so we will send in a prescription for home blood pressure cuff.  Request to check a few times a week over the next few weeks.  Again, we will reach out in about a month's time to check  "where his home blood pressure readings are running.  If remains elevated, will discuss initiation of antihypertensive.    4. Subcutaneous mass  Lesion in epigastric region most consistent with lipoma.  Offered reassurance, if not bothering him no intervention needed.  If desires removal, will plan to refer to general surgery.    Patient has been advised of split billing requirements and indicates understanding: Yes      COUNSELING:   Reviewed preventive health counseling, as reflected in patient instructions      BMI:   Estimated body mass index is 26.55 kg/m  as calculated from the following:    Height as of this encounter: 1.835 m (6' 0.25\").    Weight as of this encounter: 89.4 kg (197 lb 2 oz).   Weight management plan: Discussed healthy diet and exercise guidelines      He reports that he has been smoking cigarettes. He has never used smokeless tobacco.  Nicotine/Tobacco Cessation Plan:   Information offered: Patient not interested at this time      Laina Banda, [facility]  "

## 2023-05-23 ENCOUNTER — TELEPHONE (OUTPATIENT)
Dept: FAMILY MEDICINE | Facility: CLINIC | Age: 48
End: 2023-05-23
Payer: COMMERCIAL

## 2023-05-23 DIAGNOSIS — Z71.6 ENCOUNTER FOR TOBACCO USE CESSATION COUNSELING: Primary | ICD-10-CM

## 2023-05-23 RX ORDER — NICOTINE 21 MG/24HR
1 PATCH, TRANSDERMAL 24 HOURS TRANSDERMAL EVERY 24 HOURS
Qty: 14 PATCH | Refills: 0 | Status: SHIPPED | OUTPATIENT
Start: 2023-07-05 | End: 2023-07-19

## 2023-05-23 RX ORDER — NICOTINE 21 MG/24HR
1 PATCH, TRANSDERMAL 24 HOURS TRANSDERMAL EVERY 24 HOURS
Qty: 42 PATCH | Refills: 0 | Status: SHIPPED | OUTPATIENT
Start: 2023-05-23 | End: 2023-07-04

## 2023-05-23 NOTE — TELEPHONE ENCOUNTER
1. Encounter for tobacco use cessation counseling  - nicotine (NICODERM CQ) 21 MG/24HR 24 hr patch; Place 1 patch onto the skin every 24 hours for 42 days  Dispense: 42 patch; Refill: 0  - nicotine (NICODERM CQ) 14 MG/24HR 24 hr patch; Place 1 patch onto the skin every 24 hours for 14 days  Dispense: 14 patch; Refill: 0  - nicotine (NICODERM CQ) 7 MG/24HR 24 hr patch; Place 1 patch onto the skin every 24 hours for 14 days  Dispense: 14 patch; Refill: 0     Prescriptions sent to the pharmacy.  Start with 21 mg day patch daily for 6 weeks, then 2 weeks to the 14 mg patch strength, then 2 weeks at the 7 mg patch strength.    Laina Banda, DO

## 2023-05-23 NOTE — TELEPHONE ENCOUNTER
Pt requesting a prescription for nicotine patches, as discussed in Annual Physical appointment. Please send to [facility]

## 2023-06-01 ENCOUNTER — TELEPHONE (OUTPATIENT)
Dept: FAMILY MEDICINE | Facility: CLINIC | Age: 48
End: 2023-06-01

## 2023-06-01 NOTE — TELEPHONE ENCOUNTER
----- Message from Laina Banda, DO sent at 5/4/2023  3:09 PM CDT -----  Can you check if Sam has been able to check some home BPs and what the readings have been?

## 2023-06-01 NOTE — TELEPHONE ENCOUNTER
Patient states that he has not checked it at home but has it check at appts. He states that his bp is still running high. He couldn't remember what it has been running but knows it it still high.

## 2023-06-08 ENCOUNTER — TELEPHONE (OUTPATIENT)
Dept: FAMILY MEDICINE | Facility: CLINIC | Age: 48
End: 2023-06-08
Payer: COMMERCIAL

## 2023-10-03 DIAGNOSIS — Z71.6 ENCOUNTER FOR TOBACCO USE CESSATION COUNSELING: ICD-10-CM

## 2023-10-03 NOTE — TELEPHONE ENCOUNTER
Called and spoke to patient. He is still currently using the nicotine patches. Has 4 patches left of the 7 mg strength and reports that they have been working for him but does not feel that he is ready to go without yet. Medication and pharmacy pended for review.    Vidhya Ovalle RN

## 2023-10-03 NOTE — TELEPHONE ENCOUNTER
Incoming call from patient requesting Dr. Banda to sent a refill for nicotine patches, states he wants the 7 MG. Pharmacy attached. Please advise

## 2023-11-14 ENCOUNTER — TRANSFERRED RECORDS (OUTPATIENT)
Dept: HEALTH INFORMATION MANAGEMENT | Facility: CLINIC | Age: 48
End: 2023-11-14
Payer: COMMERCIAL